# Patient Record
Sex: FEMALE | Race: WHITE
[De-identification: names, ages, dates, MRNs, and addresses within clinical notes are randomized per-mention and may not be internally consistent; named-entity substitution may affect disease eponyms.]

---

## 2019-11-17 ENCOUNTER — HOSPITAL ENCOUNTER (EMERGENCY)
Dept: HOSPITAL 25 - UCEAST | Age: 34
Discharge: HOME | End: 2019-11-17
Payer: COMMERCIAL

## 2019-11-17 VITALS — DIASTOLIC BLOOD PRESSURE: 71 MMHG | SYSTOLIC BLOOD PRESSURE: 119 MMHG

## 2019-11-17 DIAGNOSIS — N39.0: Primary | ICD-10-CM

## 2019-11-17 PROCEDURE — 87077 CULTURE AEROBIC IDENTIFY: CPT

## 2019-11-17 PROCEDURE — 81003 URINALYSIS AUTO W/O SCOPE: CPT

## 2019-11-17 PROCEDURE — 87186 SC STD MICRODIL/AGAR DIL: CPT

## 2019-11-17 PROCEDURE — 87086 URINE CULTURE/COLONY COUNT: CPT

## 2019-11-17 PROCEDURE — 99212 OFFICE O/P EST SF 10 MIN: CPT

## 2019-11-17 PROCEDURE — G0463 HOSPITAL OUTPT CLINIC VISIT: HCPCS

## 2019-11-17 NOTE — UC
Complaint Female HPI





- HPI Summary


HPI Summary: 


3 DAYS OF DYSURIA, URINARY FREQUENCY AND URGENCY.  HAS SUPRAPUBIC PAIN AND LOW 

BACK PAIN.  DENIES FEVER.  NO NAUSEA.








- History Of Current Complaint


Chief Complaint: UCGU


Stated Complaint: URINARY COMPLAINT


Time Seen by Provider: 19 09:39


Hx Obtained From: Patient


Hx Last Menstrual Period: tubal ligation


Onset/Duration: Gradual Onset, Lasting Days, Still Present


Timing: Constant


Severity Initially: Moderate


Severity Currently: Moderate


Pain Intensity: 9


Pain Scale Used: 0-10 Numeric


Character: Burning


Aggravating Factor(s): Urination


Associated Signs And Symptoms: Positive: Back Pain.  Negative: Fever, Vaginal 

Bleeding/Discharge





- Allergies/Home Medications


Allergies/Adverse Reactions: 


 Allergies











Allergy/AdvReac Type Severity Reaction Status Date / Time


 


No Known Allergies Allergy   Verified 11/15/19 07:41














PMH/Surg Hx/FS Hx/Imm Hx


Previously Healthy: Yes





- Surgical History


Surgical History: Yes


Surgery Procedure, Year, and Place: 





- Family History


Known Family History: Positive: None





- Social History


Alcohol Use: None


Substance Use Type: None


Smoking Status (MU): Never Smoked Tobacco





Review of Systems


All Other Systems Reviewed And Are Negative: Yes


Constitutional: Positive: Chills


Respiratory: Positive: Negative


Cardiovascular: Positive: Negative


Gastrointestinal: Positive: Abdominal Pain


Genitourinary: Positive: Dysuria, Frequency, Urgency





Physical Exam


Triage Information Reviewed: Yes


Appearance: Well-Appearing, No Pain Distress, Well-Nourished


Vital Signs: 


 Initial Vital Signs











Temp  99.1 F   19 09:29


 


Pulse  93   19 09:29


 


Resp  16   19 09:29


 


BP  119/71   19 09:29


 


Pulse Ox  99   19 09:29








 Laboratory Tests











  19





  09:41


 


POC Urine Color  Dark yellow


 


POC Urine Clarity  Cloudy


 


POC Urine pH  6.5


 


POC Ur Specif Gravity  1.015


 


POC Urine Protein  2+ A


 


POC Ur Glucose (UA)  Negative


 


POC Urine Ketones  Negative


 


POC Urine Blood  3+ A


 


POC Urine Nitrite  Positive A


 


POC Urine Bilirubin  Negative


 


POC Urine Urobilinogen  0.2


 


POC U Leukocyte Esteras  3+ A











Eyes: Positive: Conjunctiva Clear


ENT: Positive: Hearing grossly normal


Neck: Positive: Supple


Respiratory: Positive: No respiratory distress, No accessory muscle use


Cardiovascular: Positive: Pulses Normal


Abdomen Description: Positive: Soft, Other: - MILD SUPRAPUBIC TENDERNESS.  

Negative: CVA Tenderness (R), CVA Tenderness (L), Distended, Guarding


Musculoskeletal: Positive: No Edema


Neurological: Positive: Alert


Psychological: Positive: Age Appropriate Behavior


Skin: Negative: Rashes





 Complaint Female Dx





- Differential Dx/Diagnosis


Provider Diagnosis: 


 UTI (urinary tract infection)








Discharge ED





- Sign-Out/Discharge


Documenting (check all that apply): Patient Departure


All imaging exams completed and their final reports reviewed: No Studies





- Discharge Plan


Condition: Stable


Disposition: HOME


Prescriptions: 


Phenazopyridine TAB* [Pyridium TAB*] 200 mg PO TID #6 tab


Sulfamethox/Trimethoprim DS* [Bactrim /160 TAB*] 1 tab PO BID #14 tab


Patient Education Materials:  Urinary Tract Infection in Women (ED)


Referrals: 


Agatha Gaxiola RN [Primary Care Provider] - If Needed





- Billing Disposition and Condition


Condition: STABLE


Disposition: Home